# Patient Record
Sex: FEMALE | Employment: FULL TIME | URBAN - METROPOLITAN AREA
[De-identification: names, ages, dates, MRNs, and addresses within clinical notes are randomized per-mention and may not be internally consistent; named-entity substitution may affect disease eponyms.]

---

## 2018-08-11 ENCOUNTER — OFFICE VISIT (OUTPATIENT)
Dept: OBGYN CLINIC | Facility: CLINIC | Age: 38
End: 2018-08-11
Payer: COMMERCIAL

## 2018-08-11 VITALS
SYSTOLIC BLOOD PRESSURE: 128 MMHG | WEIGHT: 222 LBS | HEART RATE: 88 BPM | HEIGHT: 61 IN | BODY MASS INDEX: 41.91 KG/M2 | DIASTOLIC BLOOD PRESSURE: 72 MMHG

## 2018-08-11 DIAGNOSIS — Z12.4 SCREENING FOR MALIGNANT NEOPLASM OF CERVIX: Primary | ICD-10-CM

## 2018-08-11 DIAGNOSIS — Z01.419 WELL WOMAN EXAM WITH ROUTINE GYNECOLOGICAL EXAM: ICD-10-CM

## 2018-08-11 PROCEDURE — 87624 HPV HI-RISK TYP POOLED RSLT: CPT | Performed by: OBSTETRICS & GYNECOLOGY

## 2018-08-11 PROCEDURE — 88175 CYTOPATH C/V AUTO FLUID REDO: CPT | Performed by: OBSTETRICS & GYNECOLOGY

## 2018-08-11 PROCEDURE — 99395 PREV VISIT EST AGE 18-39: CPT | Performed by: OBSTETRICS & GYNECOLOGY

## 2018-08-11 NOTE — PROGRESS NOTES
Rosa Malik is a 43year old female  Patient's last menstrual period was 2018 (exact date). Patient presents with:  Wellness Visit  . Does not desire pregnancy. Is comfortable with condoms. She has her blood work done with her primary. Paternal Aunt 58     lung   • Hypertension Father    • Lipids Father    • Hypertension Mother    • macular [OTHER] Mother    • macular [OTHER] Maternal Grandfather    • COPD [OTHER] Maternal Grandfather    No breast cancer    MEDICATIONS:    Current Outpat hemorroids     Assessment & Plan:  Wilma Billings was seen today for wellness visit. Diagnoses and all orders for this visit:    Screening for malignant neoplasm of cervix  -     THINPREP PAP SMEAR B; Future  -     HPV HIGH RISK , THIN PREP COLLECTION;  Future

## 2018-08-12 LAB — HPV I/H RISK 1 DNA SPEC QL NAA+PROBE: NEGATIVE

## 2018-08-15 LAB — LAST PAP RESULT: NORMAL

## 2019-09-07 ENCOUNTER — OFFICE VISIT (OUTPATIENT)
Dept: OBGYN CLINIC | Facility: CLINIC | Age: 39
End: 2019-09-07
Payer: COMMERCIAL

## 2019-09-07 VITALS
SYSTOLIC BLOOD PRESSURE: 122 MMHG | HEART RATE: 96 BPM | WEIGHT: 230 LBS | HEIGHT: 61 IN | DIASTOLIC BLOOD PRESSURE: 90 MMHG | BODY MASS INDEX: 43.43 KG/M2

## 2019-09-07 DIAGNOSIS — Z01.419 WELL WOMAN EXAM WITH ROUTINE GYNECOLOGICAL EXAM: Primary | ICD-10-CM

## 2019-09-07 DIAGNOSIS — Z12.31 ENCOUNTER FOR SCREENING MAMMOGRAM FOR MALIGNANT NEOPLASM OF BREAST: ICD-10-CM

## 2019-09-07 PROCEDURE — 99396 PREV VISIT EST AGE 40-64: CPT | Performed by: OBSTETRICS & GYNECOLOGY

## 2019-09-07 RX ORDER — OLOPATADINE HYDROCHLORIDE 1 MG/ML
SOLUTION/ DROPS OPHTHALMIC
Refills: 6 | COMMUNITY
Start: 2019-08-11

## 2019-09-07 NOTE — PROGRESS NOTES
Jacqueline Dunn is a 35year old female  Patient's last menstrual period was 2019 (exact date). Patient presents with:  Wellness Visit  .      Periods are regular she bleeds for 7 days she is having increasing cramping and uses over-the-counter if rarely      Drug use: No      Sexual activity: Yes        Birth control/protection: Condom    Lifestyle      Physical activity:        Days per week: Not on file        Minutes per session: Not on file      Stress: Not on file    Relationships      Social DAY, Disp: , Rfl: 6    ALLERGIES:  No Known Allergies      Review of Systems:  Constitutional:  Denies fatigue, night sweats, hot flashes  Gastrointestinal:  denies heartburn, abdominal pain, diarrhea or constipation  Genitourinary:  denies dysuria, incont

## 2020-09-12 ENCOUNTER — OFFICE VISIT (OUTPATIENT)
Dept: OBGYN CLINIC | Facility: CLINIC | Age: 40
End: 2020-09-12
Payer: COMMERCIAL

## 2020-09-12 VITALS
HEIGHT: 61 IN | WEIGHT: 235 LBS | SYSTOLIC BLOOD PRESSURE: 128 MMHG | BODY MASS INDEX: 44.37 KG/M2 | DIASTOLIC BLOOD PRESSURE: 62 MMHG

## 2020-09-12 DIAGNOSIS — Z01.419 WELL WOMAN EXAM WITH ROUTINE GYNECOLOGICAL EXAM: Primary | ICD-10-CM

## 2020-09-12 DIAGNOSIS — Z12.31 ENCOUNTER FOR SCREENING MAMMOGRAM FOR MALIGNANT NEOPLASM OF BREAST: ICD-10-CM

## 2020-09-12 PROCEDURE — 3074F SYST BP LT 130 MM HG: CPT | Performed by: OBSTETRICS & GYNECOLOGY

## 2020-09-12 PROCEDURE — 99396 PREV VISIT EST AGE 40-64: CPT | Performed by: OBSTETRICS & GYNECOLOGY

## 2020-09-12 PROCEDURE — 3008F BODY MASS INDEX DOCD: CPT | Performed by: OBSTETRICS & GYNECOLOGY

## 2020-09-12 PROCEDURE — 3078F DIAST BP <80 MM HG: CPT | Performed by: OBSTETRICS & GYNECOLOGY

## 2020-09-12 NOTE — PROGRESS NOTES
Taisha Liao is a 38year old female  Patient's last menstrual period was 2020 (exact date). Patient presents with:  Wellness Visit  . Periods are usually regular and now occasionally she will skip 1 she bleeds for 7 days.   Birth control o Medical: Not on file        Non-medical: Not on file    Tobacco Use      Smoking status: Never Smoker      Smokeless tobacco: Never Used    Substance and Sexual Activity      Alcohol use: Yes        Frequency: Monthly or less        Drinks per session: 1 o Maternal Grandfather    • Other (COPD) Maternal Grandfather    • Cancer Paternal Cousin Female 48        bladder   • Diabetes Paternal Cousin Female    • Cancer Paternal Cousin Male 48        throat and stomach       MEDICATIONS:    Current Outpatient Medi obvious masses  Perineum: normal  Anus: no hemorroids     Assessment & Plan:  Lee Ann Child was seen today for wellness visit.     Diagnoses and all orders for this visit:    Well woman exam with routine gynecological exam    Encounter for screening mammogram fo

## 2020-10-07 ENCOUNTER — TELEPHONE (OUTPATIENT)
Dept: OBGYN CLINIC | Facility: CLINIC | Age: 40
End: 2020-10-07

## 2020-10-07 NOTE — TELEPHONE ENCOUNTER
Called patient to schedule Lap Reji salpingectomy with Dr Jesus Vigil. Patient states with COVID she does not wish to have it done this year. Will call when ready to schedule. Understanding verbalized.

## 2021-09-14 ENCOUNTER — OFFICE VISIT (OUTPATIENT)
Dept: OBGYN CLINIC | Facility: CLINIC | Age: 41
End: 2021-09-14
Payer: COMMERCIAL

## 2021-09-14 VITALS
DIASTOLIC BLOOD PRESSURE: 85 MMHG | HEART RATE: 81 BPM | BODY MASS INDEX: 42.67 KG/M2 | WEIGHT: 226 LBS | SYSTOLIC BLOOD PRESSURE: 140 MMHG | HEIGHT: 61 IN

## 2021-09-14 DIAGNOSIS — Z01.419 WELL WOMAN EXAM WITH ROUTINE GYNECOLOGICAL EXAM: ICD-10-CM

## 2021-09-14 DIAGNOSIS — Z12.31 ENCOUNTER FOR SCREENING MAMMOGRAM FOR BREAST CANCER: Primary | ICD-10-CM

## 2021-09-14 DIAGNOSIS — Z12.4 PAPANICOLAOU SMEAR FOR CERVICAL CANCER SCREENING: ICD-10-CM

## 2021-09-14 PROCEDURE — 3077F SYST BP >= 140 MM HG: CPT | Performed by: OBSTETRICS & GYNECOLOGY

## 2021-09-14 PROCEDURE — 99396 PREV VISIT EST AGE 40-64: CPT | Performed by: OBSTETRICS & GYNECOLOGY

## 2021-09-14 PROCEDURE — 88175 CYTOPATH C/V AUTO FLUID REDO: CPT | Performed by: OBSTETRICS & GYNECOLOGY

## 2021-09-14 PROCEDURE — 3079F DIAST BP 80-89 MM HG: CPT | Performed by: OBSTETRICS & GYNECOLOGY

## 2021-09-14 PROCEDURE — 3008F BODY MASS INDEX DOCD: CPT | Performed by: OBSTETRICS & GYNECOLOGY

## 2021-09-14 RX ORDER — TRICLOSAN 0.15 ML/100L
SOAP TOPICAL
COMMUNITY

## 2021-09-14 NOTE — PROGRESS NOTES
Ron Kitchen is a 42year old female No obstetric history on file. Patient's last menstrual period was 08/30/2021 (exact date). Patient presents with:  Wellness Visit: okay with [employer] student   .      She had Covid was not hospitalized thinks her antibodie Substance and Sexual Activity      Alcohol use: Yes        Comment: rarely      Drug use: No      Sexual activity: Yes        Partners: Male        Birth control/protection: Condom    Other Topics      Concerns:         Service: Not Asked        B Housing in the Last Year: Not on file    FAMILY HISTORY:  Family History   Problem Relation Age of Onset   • Colon Cancer Maternal Grandmother 58   • Cancer Paternal Grandfather 72        lung   • Cancer Paternal Cousin Female 6        [facility]   • C Appropriate mood and affect    Pelvic Exam:  External Genitalia: normal appearance, hair distribution, and no lesions  Urethral Meatus:  normal in size, location, without lesions and prolapse  Bladder:  No fullness, masses or tenderness  Vagina:  Normal ap

## 2023-01-24 ENCOUNTER — OFF PREMISE (OUTPATIENT)
Dept: OTHER | Age: 43
End: 2023-01-24

## 2023-01-24 PROCEDURE — 93010 ELECTROCARDIOGRAM REPORT: CPT | Performed by: INTERNAL MEDICINE

## 2024-02-01 ENCOUNTER — OFFICE VISIT (OUTPATIENT)
Facility: CLINIC | Age: 44
End: 2024-02-01
Payer: COMMERCIAL

## 2024-02-01 VITALS
BODY MASS INDEX: 45.73 KG/M2 | HEART RATE: 94 BPM | DIASTOLIC BLOOD PRESSURE: 79 MMHG | HEIGHT: 61 IN | SYSTOLIC BLOOD PRESSURE: 127 MMHG | WEIGHT: 242.19 LBS

## 2024-02-01 DIAGNOSIS — Z01.419 WELL WOMAN EXAM WITH ROUTINE GYNECOLOGICAL EXAM: Primary | ICD-10-CM

## 2024-02-01 DIAGNOSIS — Z12.31 ENCOUNTER FOR SCREENING MAMMOGRAM FOR BREAST CANCER: ICD-10-CM

## 2024-02-01 DIAGNOSIS — Z12.4 PAPANICOLAOU SMEAR FOR CERVICAL CANCER SCREENING: ICD-10-CM

## 2024-02-01 PROCEDURE — 87624 HPV HI-RISK TYP POOLED RSLT: CPT | Performed by: OBSTETRICS & GYNECOLOGY

## 2024-02-01 PROCEDURE — 88175 CYTOPATH C/V AUTO FLUID REDO: CPT | Performed by: OBSTETRICS & GYNECOLOGY

## 2024-02-01 PROCEDURE — 99396 PREV VISIT EST AGE 40-64: CPT | Performed by: OBSTETRICS & GYNECOLOGY

## 2024-02-01 NOTE — PROGRESS NOTES
Charito Matos is a 43 year old female  Patient's last menstrual period was 2023 (approximate).   Chief Complaint   Patient presents with    Wellness Visit     Annual Exam    .     He has a period every month no bleeding between.  Occasionally has cramping.  Is happy with using condoms for birth control.  Blood work is done with her primary.  Refuses flu shot today.  Colon screening at 44 was discussed with her.    OBSTETRICS HISTORY:  OB History    Para Term  AB Living   0 0 0 0 0 0   SAB IAB Ectopic Multiple Live Births   0 0 0 0 0       GYNE HISTORY:  Periods regular monthly    History   Sexual Activity    Sexual activity: Yes    Partners: Male    Birth control/ protection: Condom        Hx Prior Abnormal Pap: No  Pap Date: 21  Pap Result Notes: Negative        MEDICAL HISTORY:  Past Medical History:   Diagnosis Date    H/O abnormal mammogram 2015    u/s needed    History of mammogram 2022    Negative    Keratoconus of both eyes     Pap smear for cervical cancer screening 02/15,    negative    Pap smear for cervical cancer screening 02/15,    negative       SURGICAL HISTORY:  Past Surgical History:   Procedure Laterality Date    Other surgical history      cyst on RT middle finger    Other surgical history      carpal tunnel    Other surgical history      cyst on elbow    Other surgical history      lump removed RT big toe    Reduction left  ,    Reduction right  ,    Removal of tonsils,<10 y/o      [place] teeth removed         SOCIAL HISTORY:  Social History     Socioeconomic History    Marital status: Single     Spouse name: Not on file    Number of children: Not on file    Years of education: Not on file    Highest education level: Not on file   Occupational History    Not on file   Tobacco Use    Smoking status: Never    Smokeless tobacco: Never   Vaping Use    Vaping Use: Never used   Substance and Sexual Activity     Alcohol use: Not Currently     Comment: rarely    Drug use: No    Sexual activity: Yes     Partners: Male     Birth control/protection: Condom   Other Topics Concern     Service Not Asked    Blood Transfusions Not Asked    Caffeine Concern No     Comment: soda    Occupational Exposure Not Asked    Hobby Hazards Not Asked    Sleep Concern Not Asked    Stress Concern Not Asked    Weight Concern Not Asked    Special Diet Not Asked    Back Care Not Asked    Exercise No    Bike Helmet Not Asked    Seat Belt Yes    Self-Exams Not Asked   Social History Narrative    Not on file     Social Determinants of Health     Financial Resource Strain: Not on file   Food Insecurity: Not on file   Transportation Needs: Not on file   Physical Activity: Not on file   Stress: Not on file   Social Connections: Not on file   Housing Stability: Not on file       FAMILY HISTORY:  Family History   Problem Relation Age of Onset    Colon Cancer Maternal Grandmother 62    Cancer Paternal Grandfather 65        lung    Cancer Paternal Cousin Female 6        neuroblasthoma    Cancer Paternal Aunt 62        lung    Hypertension Father     Lipids Father     Hypertension Mother     Other (macular) Mother     Other (macular) Maternal Grandfather     Other (COPD) Maternal Grandfather     Cancer Paternal Cousin Female 50        bladder    Diabetes Paternal Cousin Female     Cancer Paternal Cousin Male 53        throat and stomach       MEDICATIONS:    Current Outpatient Medications:     Multiple Vitamins-Minerals (HEALTHY EYES) Oral Tab, Take by mouth., Disp: , Rfl:     Ascorbic Acid (VITAMIN C OR), Take by mouth., Disp: , Rfl:     Olopatadine HCl 0.1 % Ophthalmic Solution, INSTILL 1 DROP IN EACH EYE TWO TIMES A DAY, Disp: , Rfl: 6    CRANBERRY CONCENTRATE OR, Take by mouth., Disp: , Rfl:     Flaxseed, Linseed, (FLAX OR), Take by mouth. (Patient not taking: Reported on 2/1/2024), Disp: , Rfl:     ALLERGIES:  No Known Allergies      Review of  Systems:  Constitutional:  Denies fatigue, night sweats, hot flashes  Gastrointestinal:  denies heartburn, abdominal pain, diarrhea or constipation  Genitourinary:  denies dysuria, incontinence, abnormal vaginal discharge, vaginal itching  Skin/Breast:  Denies any breast pain, lumps, or discharge.   Neurological:  denies headaches, extremity weakness or numbness.      PHYSICAL EXAM:   Constitutional: well developed, well nourished  Head/Face: normocephalic  Neck/Thyroid: thyroid symmetric, no thyromegaly, no nodules, no adenopathy  Breast: normal without palpable masses, tenderness, asymmetry, nipple discharge, nipple retraction or skin changes  Abdomen:  soft, nontender, nondistended, no masses  Skin/Hair: no unusual rashes or bruises   Extremities: no edema, no cyanosis  Psychiatric:  Oriented to time, place, person and situation. Appropriate mood and affect    Pelvic Exam:  External Genitalia: normal appearance, hair distribution, and no lesions  Urethral Meatus:  normal in size, location, without lesions and prolapse  Bladder:  No fullness, masses or tenderness  Vagina:  Normal appearance without lesions, no abnormal discharge  Cervix:  Normal without tenderness on motion without lesions Pap.  Uterus: normal in size, contour, position, mobility, without tenderness  Adnexa: normal without masses or tenderness.  Perineum: normal  Anus: no hemorroids     Assessment & Plan:  Charito was seen today for wellness visit.    Diagnoses and all orders for this visit:    Well woman exam with routine gynecological exam    Papanicolaou smear for cervical cancer screening  -     ThinPrep PAP with HPV Reflex Request B; Future    Encounter for screening mammogram for breast cancer  -     [facility] JACEY 2D+3D SCREENING BILAT (CPT=77067/[id number]); Future

## 2024-02-02 ENCOUNTER — TELEPHONE (OUTPATIENT)
Facility: CLINIC | Age: 44
End: 2024-02-02

## 2024-02-02 NOTE — TELEPHONE ENCOUNTER
Mammogram results abstracted and placed in doctors bin for review and recommendations. Abstracted and placed in doctors bin for review and recommendations.

## 2024-02-06 LAB
.: NORMAL
.: NORMAL

## 2024-02-07 LAB — HPV I/H RISK 1 DNA SPEC QL NAA+PROBE: NEGATIVE

## 2024-09-12 ENCOUNTER — OFFICE VISIT (OUTPATIENT)
Facility: CLINIC | Age: 44
End: 2024-09-12
Payer: COMMERCIAL

## 2024-09-12 ENCOUNTER — TELEPHONE (OUTPATIENT)
Facility: CLINIC | Age: 44
End: 2024-09-12

## 2024-09-12 VITALS
SYSTOLIC BLOOD PRESSURE: 126 MMHG | BODY MASS INDEX: 46.41 KG/M2 | WEIGHT: 245.81 LBS | DIASTOLIC BLOOD PRESSURE: 74 MMHG | HEIGHT: 61 IN | HEART RATE: 88 BPM

## 2024-09-12 DIAGNOSIS — R35.0 URINARY FREQUENCY: Primary | ICD-10-CM

## 2024-09-12 DIAGNOSIS — Z12.4 PAPANICOLAOU SMEAR FOR CERVICAL CANCER SCREENING: ICD-10-CM

## 2024-09-12 PROCEDURE — 3074F SYST BP LT 130 MM HG: CPT | Performed by: OBSTETRICS & GYNECOLOGY

## 2024-09-12 PROCEDURE — 99213 OFFICE O/P EST LOW 20 MIN: CPT | Performed by: OBSTETRICS & GYNECOLOGY

## 2024-09-12 PROCEDURE — 87086 URINE CULTURE/COLONY COUNT: CPT | Performed by: OBSTETRICS & GYNECOLOGY

## 2024-09-12 PROCEDURE — 3078F DIAST BP <80 MM HG: CPT | Performed by: OBSTETRICS & GYNECOLOGY

## 2024-09-12 PROCEDURE — 87624 HPV HI-RISK TYP POOLED RSLT: CPT | Performed by: OBSTETRICS & GYNECOLOGY

## 2024-09-12 PROCEDURE — 3008F BODY MASS INDEX DOCD: CPT | Performed by: OBSTETRICS & GYNECOLOGY

## 2024-09-12 NOTE — PROGRESS NOTES
Charito Matos is a 44 year old female  Patient's last menstrual period was 2024 (exact date).   Chief Complaint   Patient presents with    Gyn Problem     Started 6 weeks ago has been having burning and pressure.. it has gotten better but now pt states it mostly just having urgency    .     She was having urgency no loss of urine.  She has blood in her urine she saw urologist and he ordered a CAT scan that is pending.  She has not had a cystoscope.  Her symptoms have resolved.  No vaginal itching.  She desires permanent sterilization risk benefits and options were discussed she refused anything hormonal medicine or the IUDs.    OBSTETRICS HISTORY:  OB History    Para Term  AB Living   0 0 0 0 0 0   SAB IAB Ectopic Multiple Live Births   0 0 0 0 0       GYNE HISTORY:  Periods regular monthly    History   Sexual Activity    Sexual activity: Yes    Partners: Male    Birth control/ protection: Condom                 MEDICAL HISTORY:  Past Medical History:    H/O abnormal mammogram    u/s needed    H/O mammogram    no evidence of malignancy    History of mammogram    Negative    Keratoconus of both eyes    Pap smear for cervical cancer screening    negative    Pap smear for cervical cancer screening    negative       SURGICAL HISTORY:  Past Surgical History:   Procedure Laterality Date    Other surgical history      cyst on RT middle finger    Other surgical history      carpal tunnel    Other surgical history      cyst on elbow    Other surgical history      lump removed RT big toe    Reduction left  ,    Reduction right  ,    Removal of tonsils,<10 y/o      [place] teeth removed         SOCIAL HISTORY:  Social History     Socioeconomic History    Marital status: Single     Spouse name: Not on file    Number of children: Not on file    Years of education: Not on file    Highest education level: Not on file   Occupational History    Not on file   Tobacco Use     Smoking status: Never    Smokeless tobacco: Never   Vaping Use    Vaping status: Never Used   Substance and Sexual Activity    Alcohol use: Not Currently    Drug use: No    Sexual activity: Yes     Partners: Male     Birth control/protection: Condom   Other Topics Concern     Service Not Asked    Blood Transfusions Not Asked    Caffeine Concern No     Comment: soda    Occupational Exposure Not Asked    Hobby Hazards Not Asked    Sleep Concern Not Asked    Stress Concern Not Asked    Weight Concern Not Asked    Special Diet Not Asked    Back Care Not Asked    Exercise No    Bike Helmet Not Asked    Seat Belt Yes    Self-Exams Not Asked   Social History Narrative    Not on file     Social Determinants of Health     Financial Resource Strain: Not on file   Food Insecurity: Not on file   Transportation Needs: Not on file   Physical Activity: Not on file   Stress: Not on file   Social Connections: Not on file   Housing Stability: Not on file       FAMILY HISTORY:  Family History   Problem Relation Age of Onset    Colon Cancer Maternal Grandmother 62    Cancer Paternal Grandfather 65        lung    Cancer Paternal Cousin Female 6        neuroblasthoma    Cancer Paternal Aunt 62        lung    Hypertension Father     Lipids Father     Hypertension Mother     Other (macular) Mother     Other (macular) Maternal Grandfather     Other (COPD) Maternal Grandfather     Cancer Paternal Cousin Female 50        bladder    Diabetes Paternal Cousin Female     Cancer Paternal Cousin Male 53        throat and stomach       MEDICATIONS:    Current Outpatient Medications:     Multiple Vitamins-Minerals (HEALTHY EYES) Oral Tab, Take by mouth., Disp: , Rfl:     Ascorbic Acid (VITAMIN C OR), Take by mouth., Disp: , Rfl:     Olopatadine HCl 0.1 % Ophthalmic Solution, INSTILL 1 DROP IN EACH EYE TWO TIMES A DAY, Disp: , Rfl: 6    CRANBERRY CONCENTRATE OR, Take by mouth., Disp: , Rfl:     Flaxseed, Linseed, (FLAX OR), Take by mouth.  (Patient not taking: Reported on 2/1/2024), Disp: , Rfl:     ALLERGIES:  No Known Allergies      Review of Systems:  Constitutional:  Denies fatigue, night sweats, hot flashes  Genitourinary:  denies dysuria, incontinence, abnormal vaginal discharge, vaginal itching    Neurological:  denies headaches, extremity weakness or numbness.      PHYSICAL EXAM:   Constitutional: well developed, well nourished  Head/Face: normocephalic    Skin/Hair: no unusual rashes or bruises   Extremities: no edema, no cyanosis  Psychiatric:  Oriented to time, place, person and situation. Appropriate mood and affect    Pelvic Exam:  External Genitalia: normal appearance, hair distribution, and no lesions  Urethral Meatus:  normal in size, location, without lesions and prolapse  Bladder:  No fullness, masses or tenderness  Vagina:  Normal appearance without lesions, no abnormal discharge  Cervix:  Normal without tenderness on motion  Uterus: normal in size, contour, position, mobility, without tenderness  Adnexa: normal without masses or tenderness without obvious masses  Perineum: normal  Anus: no hemorroids     Assessment & Plan:  Charito was seen today for gyn problem.    Diagnoses and all orders for this visit:    Urinary frequency  -     Culture Urine; Future    Papanicolaou smear for cervical cancer screening  -     ThinPrep PAP with HPV Reflex Request B; Future        Desire for permanent sterilization

## 2024-09-12 NOTE — TELEPHONE ENCOUNTER
----- Message from Cheryle Horton sent at 9/12/2024 11:59 AM CDT -----  Regarding: Schedule Surgery   Laparoscopic removal of fallopian tubes with Dr. Stratton

## 2024-09-18 LAB
.: NORMAL
.: NORMAL
HPV E6+E7 MRNA CVX QL NAA+PROBE: NEGATIVE

## 2024-09-19 ENCOUNTER — OFFICE VISIT (OUTPATIENT)
Facility: CLINIC | Age: 44
End: 2024-09-19
Payer: COMMERCIAL

## 2024-09-19 VITALS
DIASTOLIC BLOOD PRESSURE: 68 MMHG | BODY MASS INDEX: 46.63 KG/M2 | HEART RATE: 95 BPM | WEIGHT: 247 LBS | SYSTOLIC BLOOD PRESSURE: 128 MMHG | HEIGHT: 61 IN

## 2024-09-19 DIAGNOSIS — Z30.09 STERILIZATION CONSULT: Primary | ICD-10-CM

## 2024-09-19 PROCEDURE — 3078F DIAST BP <80 MM HG: CPT | Performed by: OBSTETRICS & GYNECOLOGY

## 2024-09-19 PROCEDURE — 3008F BODY MASS INDEX DOCD: CPT | Performed by: OBSTETRICS & GYNECOLOGY

## 2024-09-19 PROCEDURE — 3074F SYST BP LT 130 MM HG: CPT | Performed by: OBSTETRICS & GYNECOLOGY

## 2024-09-19 PROCEDURE — 99214 OFFICE O/P EST MOD 30 MIN: CPT | Performed by: OBSTETRICS & GYNECOLOGY

## 2024-09-22 NOTE — PROGRESS NOTES
Charito Matos is a 44 year old female  Patient's last menstrual period was 2024 (exact date).   Chief Complaint   Patient presents with    Consult     Discuss Laparoscopic removal of fallopian surgery    .Patient desires permanent sterilization    OBSTETRICS HISTORY:  OB History    Para Term  AB Living   0 0 0 0 0 0   SAB IAB Ectopic Multiple Live Births   0 0 0 0 0       GYNE HISTORY:  Periods regular monthly    History   Sexual Activity    Sexual activity: Yes    Partners: Male    Birth control/ protection: Condom                 MEDICAL HISTORY:  Past Medical History:    H/O abnormal mammogram    u/s needed    H/O mammogram    no evidence of malignancy    History of mammogram    Negative    Keratoconus of both eyes    Pap smear for cervical cancer screening    negative    Pap smear for cervical cancer screening    negative       SURGICAL HISTORY:  Past Surgical History:   Procedure Laterality Date    Other surgical history      cyst on RT middle finger    Other surgical history      carpal tunnel    Other surgical history      cyst on elbow    Other surgical history      lump removed RT big toe    Reduction left  ,    Reduction right  ,    Removal of tonsils,<12 y/o      [place] teeth removed         SOCIAL HISTORY:  Social History     Socioeconomic History    Marital status: Single     Spouse name: Not on file    Number of children: Not on file    Years of education: Not on file    Highest education level: Not on file   Occupational History    Not on file   Tobacco Use    Smoking status: Never    Smokeless tobacco: Never   Vaping Use    Vaping status: Never Used   Substance and Sexual Activity    Alcohol use: Not Currently    Drug use: No    Sexual activity: Yes     Partners: Male     Birth control/protection: Condom   Other Topics Concern     Service Not Asked    Blood Transfusions Not Asked    Caffeine Concern No     Comment: soda     Occupational Exposure Not Asked    Hobby Hazards Not Asked    Sleep Concern Not Asked    Stress Concern Not Asked    Weight Concern Not Asked    Special Diet Not Asked    Back Care Not Asked    Exercise No    Bike Helmet Not Asked    Seat Belt Yes    Self-Exams Not Asked   Social History Narrative    Not on file     Social Determinants of Health     Financial Resource Strain: Not on file   Food Insecurity: Not on file   Transportation Needs: Not on file   Physical Activity: Not on file   Stress: Not on file   Social Connections: Not on file   Housing Stability: Not on file       FAMILY HISTORY:  Family History   Problem Relation Age of Onset    Colon Cancer Maternal Grandmother 62    Cancer Paternal Grandfather 65        lung    Cancer Paternal Cousin Female 6        neuroblasthoma    Cancer Paternal Aunt 62        lung    Hypertension Father     Lipids Father     Hypertension Mother     Other (macular) Mother     Other (macular) Maternal Grandfather     Other (COPD) Maternal Grandfather     Cancer Paternal Cousin Female 50        bladder    Diabetes Paternal Cousin Female     Cancer Paternal Cousin Male 53        throat and stomach       MEDICATIONS:    Current Outpatient Medications:     Multiple Vitamins-Minerals (HEALTHY EYES) Oral Tab, Take by mouth., Disp: , Rfl:     Ascorbic Acid (VITAMIN C OR), Take by mouth., Disp: , Rfl:     Olopatadine HCl 0.1 % Ophthalmic Solution, INSTILL 1 DROP IN EACH EYE TWO TIMES A DAY, Disp: , Rfl: 6    CRANBERRY CONCENTRATE OR, Take by mouth., Disp: , Rfl:     Flaxseed, Linseed, (FLAX OR), Take by mouth. (Patient not taking: Reported on 2/1/2024), Disp: , Rfl:     ALLERGIES:  No Known Allergies      Review of Systems:  Constitutional:  Denies fatigue, night sweats, hot flashes  Eyes:  denies blurred or double vision  Cardiovascular:  denies chest pain or palpitations  Respiratory:  denies shortness of breath  Gastrointestinal:  denies heartburn, abdominal pain, diarrhea or  constipation  Genitourinary:  denies dysuria, incontinence, abnormal vaginal discharge, vaginal itching  Musculoskeletal:  denies back pain.  Skin/Breast:  Denies any breast pain, lumps, or discharge.   Neurological:  denies headaches, extremity weakness or numbness.  Psychiatric: denies depression or anxiety.  Endocrine:   denies excessive thirst or urination.  Heme/Lymph:  denies history of anemia, easy bruising or bleeding.      PHYSICAL EXAM:   Constitutional: well developed, well nourished  Head/Face: normocephalic  Skin/Hair: no unusual rashes or bruises  Extremities: no edema, no cyanosis  Psychiatric:  Oriented to time, place, person and situation. Appropriate mood and affect    Discussed laparoscopic bilateral salpingectomy in length, risk of infection, bleeding, injury to internal organs. Multiple questions answered. Irreversible procedure      Assessment & Plan:  Diagnoses and all orders for this visit:    Sterilization consult    - will schedule lap b/l salpingectomy

## (undated) NOTE — MR AVS SNAPSHOT
After Visit Summary   9/14/2021    Darleen Boyer   MRN: [id number]           Visit Information     Date & Time  9/14/2021  6:00 PM Provider  Chelsi Ceron MD [id number] [facility]  Phone  [phone] [place] [facility] Scheduling at [phone]. September 15, 2021      [address] [place] [address] [address] [address]     Dear Lorelei Fuentes : Thank you for enrolling in [employer] [place].  Please follow the instructions below to securely acces conditions such as allergies, colds, cough, fever, rash, sore throat, headache and pink eye. The cost for a Video Visit is currently $35.         If you receive a survey from [employer], please take a few minutes to complete it and provide feedback.  We s needing urgent attention, but are   non-life-threatening. Also available by appointment Average cost  $120*     EMERGENCY ROOM Life-threatening emergencies needing immediate intervention at a hospital emergency room.  Average cost  $2,300*   *Cost varies